# Patient Record
Sex: FEMALE | Race: WHITE
[De-identification: names, ages, dates, MRNs, and addresses within clinical notes are randomized per-mention and may not be internally consistent; named-entity substitution may affect disease eponyms.]

---

## 2019-01-09 ENCOUNTER — HOSPITAL ENCOUNTER (OUTPATIENT)
Dept: HOSPITAL 96 - M.RAD | Age: 79
End: 2019-01-09
Attending: FAMILY MEDICINE
Payer: COMMERCIAL

## 2019-01-09 DIAGNOSIS — Z12.31: Primary | ICD-10-CM

## 2020-06-17 ENCOUNTER — HOSPITAL ENCOUNTER (OUTPATIENT)
Dept: HOSPITAL 96 - M.RAD | Age: 80
End: 2020-06-17
Attending: FAMILY MEDICINE
Payer: COMMERCIAL

## 2020-06-17 DIAGNOSIS — Z12.31: Primary | ICD-10-CM

## 2020-08-30 ENCOUNTER — HOSPITAL ENCOUNTER (OUTPATIENT)
Dept: HOSPITAL 96 - M.ERS | Age: 80
Setting detail: OBSERVATION
LOS: 2 days | Discharge: HOME | End: 2020-09-01
Attending: INTERNAL MEDICINE | Admitting: INTERNAL MEDICINE
Payer: COMMERCIAL

## 2020-08-30 VITALS — DIASTOLIC BLOOD PRESSURE: 45 MMHG | SYSTOLIC BLOOD PRESSURE: 138 MMHG

## 2020-08-30 VITALS — WEIGHT: 147.4 LBS | BODY MASS INDEX: 30.94 KG/M2 | HEIGHT: 57.99 IN

## 2020-08-30 VITALS — SYSTOLIC BLOOD PRESSURE: 113 MMHG | DIASTOLIC BLOOD PRESSURE: 56 MMHG

## 2020-08-30 VITALS — SYSTOLIC BLOOD PRESSURE: 104 MMHG | DIASTOLIC BLOOD PRESSURE: 54 MMHG

## 2020-08-30 DIAGNOSIS — R55: Primary | ICD-10-CM

## 2020-08-30 DIAGNOSIS — I95.1: ICD-10-CM

## 2020-08-30 DIAGNOSIS — E78.5: ICD-10-CM

## 2020-08-30 DIAGNOSIS — Z79.82: ICD-10-CM

## 2020-08-30 DIAGNOSIS — I10: ICD-10-CM

## 2020-08-30 DIAGNOSIS — Z20.828: ICD-10-CM

## 2020-08-30 DIAGNOSIS — Z79.899: ICD-10-CM

## 2020-08-30 DIAGNOSIS — N28.9: ICD-10-CM

## 2020-08-30 LAB
ABSOLUTE BASOPHILS: 0.1 THOU/UL (ref 0–0.2)
ABSOLUTE EOSINOPHILS: 0 THOU/UL (ref 0–0.7)
ABSOLUTE MONOCYTES: 0.9 THOU/UL (ref 0–1.2)
ALBUMIN SERPL-MCNC: 3.5 G/DL (ref 3.4–5)
ALP SERPL-CCNC: 61 U/L (ref 46–116)
ALT SERPL-CCNC: 20 U/L (ref 30–65)
ANION GAP SERPL CALC-SCNC: 8 MMOL/L (ref 7–16)
AST SERPL-CCNC: 21 U/L (ref 15–37)
BACTERIA #/AREA URNS HPF: (no result) /HPF
BASOPHILS NFR BLD AUTO: 0.9 %
BILIRUB SERPL-MCNC: 0.3 MG/DL
BUN SERPL-MCNC: 32 MG/DL (ref 7–18)
CALCIUM SERPL-MCNC: 8.7 MG/DL (ref 8.5–10.1)
CHLORIDE SERPL-SCNC: 100 MMOL/L (ref 98–107)
CO2 SERPL-SCNC: 26 MMOL/L (ref 21–32)
CREAT SERPL-MCNC: 1.4 MG/DL (ref 0.6–1.3)
EOSINOPHIL NFR BLD: 0.2 %
GLUCOSE SERPL-MCNC: 133 MG/DL (ref 70–99)
GRANULOCYTES NFR BLD MANUAL: 79.9 %
HCT VFR BLD CALC: 39.9 % (ref 37–47)
HGB BLD-MCNC: 13.6 GM/DL (ref 12–15)
HYALINE CASTS #/AREA URNS LPF: (no result) /LPF
LYMPHOCYTES # BLD: 1.8 THOU/UL (ref 0.8–5.3)
LYMPHOCYTES NFR BLD AUTO: 12.9 %
MCH RBC QN AUTO: 32 PG (ref 26–34)
MCHC RBC AUTO-ENTMCNC: 34.1 G/DL (ref 28–37)
MCV RBC: 93.8 FL (ref 80–100)
MONOCYTES NFR BLD: 6.1 %
MPV: 8.2 FL. (ref 7.2–11.1)
NEUTROPHILS # BLD: 11.2 THOU/UL (ref 1.6–8.1)
NUCLEATED RBCS: 0 /100WBC
PLATELET COUNT*: 305 THOU/UL (ref 150–400)
POTASSIUM SERPL-SCNC: 4.9 MMOL/L (ref 3.5–5.1)
PROT SERPL-MCNC: 7.4 G/DL (ref 6.4–8.2)
RBC # BLD AUTO: 4.25 MIL/UL (ref 4.2–5)
RBC #/AREA URNS HPF: (no result) /HPF (ref 0–2)
RDW-CV: 13.3 % (ref 10.5–14.5)
SODIUM SERPL-SCNC: 134 MMOL/L (ref 136–145)
SQUAMOUS: (no result) /LPF (ref 0–3)
WBC # BLD AUTO: 14.1 THOU/UL (ref 4–11)
WBC #/AREA URNS HPF: (no result) /HPF (ref 0–5)

## 2020-08-31 VITALS — SYSTOLIC BLOOD PRESSURE: 153 MMHG | DIASTOLIC BLOOD PRESSURE: 59 MMHG

## 2020-08-31 VITALS — SYSTOLIC BLOOD PRESSURE: 128 MMHG | DIASTOLIC BLOOD PRESSURE: 37 MMHG

## 2020-08-31 VITALS — SYSTOLIC BLOOD PRESSURE: 113 MMHG | DIASTOLIC BLOOD PRESSURE: 36 MMHG

## 2020-08-31 VITALS — DIASTOLIC BLOOD PRESSURE: 35 MMHG | SYSTOLIC BLOOD PRESSURE: 133 MMHG

## 2020-08-31 VITALS — SYSTOLIC BLOOD PRESSURE: 141 MMHG | DIASTOLIC BLOOD PRESSURE: 41 MMHG

## 2020-08-31 LAB
ANION GAP SERPL CALC-SCNC: 5 MMOL/L (ref 7–16)
ANION GAP SERPL CALC-SCNC: 5 MMOL/L (ref 7–16)
BUN SERPL-MCNC: 25 MG/DL (ref 7–18)
BUN SERPL-MCNC: 27 MG/DL (ref 7–18)
CALCIUM SERPL-MCNC: 8.3 MG/DL (ref 8.5–10.1)
CALCIUM SERPL-MCNC: 8.4 MG/DL (ref 8.5–10.1)
CHLORIDE SERPL-SCNC: 99 MMOL/L (ref 98–107)
CHLORIDE SERPL-SCNC: 99 MMOL/L (ref 98–107)
CO2 SERPL-SCNC: 26 MMOL/L (ref 21–32)
CO2 SERPL-SCNC: 27 MMOL/L (ref 21–32)
CREAT SERPL-MCNC: 1.3 MG/DL (ref 0.6–1.3)
CREAT SERPL-MCNC: 1.4 MG/DL (ref 0.6–1.3)
GLUCOSE SERPL-MCNC: 109 MG/DL (ref 70–99)
GLUCOSE SERPL-MCNC: 140 MG/DL (ref 70–99)
POTASSIUM SERPL-SCNC: 4.4 MMOL/L (ref 3.5–5.1)
POTASSIUM SERPL-SCNC: 4.7 MMOL/L (ref 3.5–5.1)
SODIUM SERPL-SCNC: 130 MMOL/L (ref 136–145)
SODIUM SERPL-SCNC: 131 MMOL/L (ref 136–145)

## 2020-08-31 NOTE — 2DMMODE
[place] [address]
Phone:  [phone] 2 D/M-MODE ECHOCARDIOGRAM     
_______________________________________________________________________________
 
Name:         YESSICA KNIGHT               Room:          [address]
SHABNAM#:    [id number]     Account #:     [id number]  
Admission:    20    Attend Phys:   Khushboo Langford, 
Discharge:                Date of Birth: [birth year]  
Date of Service: 20 1614  Report #:      [id number]
        [id number]
_______________________________________________________________________________
THIS REPORT FOR:
 
cc:  Meme English MD, Tuongvan T. MD Holkins, John M. MD [facility]       
                                                                       ~
 
--------------- APPROVED REPORT --------------
 
 
Study performed:  2020 13:48:52
 
EXAM: Comprehensive 2D, Doppler, and color-flow 
Echocardiogram 
Patient Location: In-Patient   
Room #:  224     Status:  routine
 
      BSA:         1.58
HR: 73 bpm BP:          133/35 mmHg 
Rhythm: NSR     
 
Other Information 
Study Quality: Good
 
Indications
Chest Pain
 
2D Dimensions
IVSd:  9.81 (7-11mm) LVOT Diam:  19.65 (18-24mm) 
LVDd:  43.95 mm  
PWd:  8.88 (7-11mm) Ascending Ao:  28.00 (22-36mm)
LVDs:  26.49 (25-40mm) 
Aortic Root:  25.67 mm 
 
Volumes
Left Atrial Volume (Systole) 
    LA ESV Index:  25.20 mL/m2
 
Aortic Valve
AoV Peak Kel.:  1.67 m/s 
AO Peak Gr.:  11.13 mmHg LVOT Max P.78 mmHg
AO Mean Gr.:  5.30 mmHg  LVOT Mean PG:  3.16 mmHg
    LVOT Max V:  1.30 m/s
AO V2 VTI:  37.09 cm  LVOT Mean V:  0.81 m/s
JOHANNA (VTI):  2.58 cm2  LVOT V1 VTI:  31.57 cm
 
 
 
[place] [address]
Phone:  [phone]                     2 D/M-MODE ECHOCARDIOGRAM     
_______________________________________________________________________________
 
Name:         YESSICA KNIGHT               Room:          [address]#:    [id number]     Account #:     [id number]  
Admission:    20    Attend Phys:   Khushboo Langford, 
Discharge:                Date of Birth: [birth year]  
Date of Service: 20 1614  Report #:      [id number]
        [id number]
_______________________________________________________________________________
Mitral Valve
MV Mean Gr.:  2.82 mmHg  E/A Ratio:  0.74
    MV Decel. Time:  341.16 ms
MV E Max Kel.:  0.93 m/s 
MV PHT:  98.94 ms  
MVA (PHT):  2.22 cm2  
 
TDI
E/Lateral E':  10.33 E/Medial E':  11.63
   Medial E' Kel.:  0.08 m/s
   Lateral E' Kel.:  0.09 m/s
 
Pulmonary Valve
PV Peak Kel.:  0.96 m/s PV Peak Gr.:  3.68 mmHg
 
Tricuspid Valve
    RAP Estimate:  5.00 mmHg
TR Peak Gr.:  26.52 mmHg RVSP:  31.00 mmHg
    PA Pressure:  31.00 mmHg
 
Left Ventricle
The left ventricle is normal size. There is normal LV segmental wall 
motion. There is normal left ventricular wall thickness. Left 
ventricular systolic function is normal. The left ventricular 
ejection fraction is within the normal range. LVEF is 60%. Grade I - 
abnormal relaxation pattern.
 
Right Ventricle
The right ventricle is normal size. The right ventricular systolic 
function is normal.
 
Atria
The left atrium size is normal. The right atrium size is 
normal.
 
Aortic Valve
Mild aortic valve sclerosis. No aortic regurgitation is present. 
There is no aortic valvular stenosis.
 
Mitral Valve
The mitral valve is normal in structure. Mild mitral regurgitation. 
No evidence of mitral valve stenosis.
 
Tricuspid Valve
The tricuspid valve is normal in structure. Mild tricuspid 
regurgitation.
 
 
[place] [address]
Phone:  [phone]                     2 D/M-MODE ECHOCARDIOGRAM     
_______________________________________________________________________________
 
Name:         YESSICA KNIGHT               Room:          [address]#:    [id number]     Account #:     [id number]  
Admission:    20    Attend Phys:   Khushboo Langford, 
Discharge:                Date of Birth: [birth year]  
Date of Service: 20 1614  Report #:      [id number]
        [id number]
_______________________________________________________________________________
 
Pulmonic Valve
The pulmonary valve is normal in structure. Trace pulmonic 
regurgitation.
 
Great Vessels
The aortic root is normal in size. IVC is normal in size and 
collapses >50% with inspiration.
 
Pericardium
There is no pericardial effusion.
 
<Conclusion>
The left ventricle is normal size.
There is normal left ventricular wall thickness.
Left ventricular systolic function is normal.
The left ventricular ejection fraction is within the normal 
range.
LVEF is 60%.
Grade I - abnormal relaxation pattern.
The right ventricle is normal size.
The left atrium size is normal.
The right atrium size is normal.
Mild aortic valve sclerosis.
No aortic regurgitation is present.
There is no aortic valvular stenosis.
The mitral valve is normal in structure.
Mild mitral regurgitation.
No evidence of mitral valve stenosis.
The tricuspid valve is normal in structure.
Mild tricuspid regurgitation.
IVC is normal in size and collapses >50% with inspiration.
There is no pericardial effusion.
There is normal LV segmental wall motion.
 
 
 
 
 
 
 
 
 
 
  <ELECTRONICALLY SIGNED>
                                           By: Doug Pham MD, FACC     
  20     1614
D: 20 1614   _____________________________________
T: 20 1614   Doug Pham MD, FACC       /INF

## 2020-08-31 NOTE — EKG
[place] [address]
Phone:  [phone]                     ELECTROCARDIOGRAM REPORT      
_______________________________________________________________________________
 
Name:         YESSICA KNIGHT DOROTHY               Room:          [address]#:    [id number]     Account #:     [id number]  
Admission:    20    Attend Phys:   Khushboo Langford, 
Discharge:                Date of Birth: [birth year]  
Date of Service: 20  Report #:      [id number]
        [id number]
_______________________________________________________________________________
THIS REPORT FOR:  //name//                      
 
                         [facility] ED
                                       
Test Date:    2020               Test Time:    20:16:49
Pat Name:     YESSICA KNIGHT            Department:   
Patient ID:   [id number]            Room:         [facility]
Gender:       F                        Technician:   JAIDA
:          1940               Requested By: Bebe Judd
Order Number: [id number] MD:   Doug Pham
                                 Measurements
Intervals                              Axis          
Rate:         61                       P:            50
ID:           [id number]                      QRS:          -15
QRSD:         84                       T:            6
QT:           466                                    
QTc:          470                                    
                           Interpretive Statements
Sinus rhythm
Borderline left axis deviation
Low voltage, precordial leads
Consider inferior and anterior infarcts
Compared to ECG 2014 10:02:13
Low QRS voltage now present
Myocardial infarct finding now present
Sinus bradycardia no longer present
Electronically Signed On 2020 9:58:30 CDT by Doug Pham
[email or web address]
 
 
 
 
 
 
 
 
 
 
 
 
 
 
 
 
  <ELECTRONICALLY SIGNED>
                                           By: Doug Pham MD, FAC     
  20     0958
D: 20   _____________________________________
T: 20   Doug Pham MD, [facility]       /EPI

## 2020-09-01 VITALS — SYSTOLIC BLOOD PRESSURE: 147 MMHG | DIASTOLIC BLOOD PRESSURE: 41 MMHG

## 2020-09-01 VITALS — SYSTOLIC BLOOD PRESSURE: 130 MMHG | DIASTOLIC BLOOD PRESSURE: 49 MMHG

## 2020-09-01 VITALS — DIASTOLIC BLOOD PRESSURE: 45 MMHG | SYSTOLIC BLOOD PRESSURE: 105 MMHG

## 2020-09-01 VITALS — SYSTOLIC BLOOD PRESSURE: 137 MMHG | DIASTOLIC BLOOD PRESSURE: 81 MMHG

## 2020-09-01 VITALS — DIASTOLIC BLOOD PRESSURE: 41 MMHG | SYSTOLIC BLOOD PRESSURE: 147 MMHG

## 2020-09-01 LAB
ANION GAP SERPL CALC-SCNC: 5 MMOL/L (ref 7–16)
BUN SERPL-MCNC: 19 MG/DL (ref 7–18)
CALCIUM SERPL-MCNC: 8.5 MG/DL (ref 8.5–10.1)
CHLORIDE SERPL-SCNC: 103 MMOL/L (ref 98–107)
CHOLEST SERPL-MCNC: 131 MG/DL (ref ?–200)
CO2 SERPL-SCNC: 25 MMOL/L (ref 21–32)
CREAT SERPL-MCNC: 1.1 MG/DL (ref 0.6–1.3)
GLUCOSE SERPL-MCNC: 86 MG/DL (ref 70–99)
HDLC SERPL-MCNC: 82 MG/DL (ref 40–?)
LDLC SERPL-MCNC: 35 MG/DL (ref ?–100)
POTASSIUM SERPL-SCNC: 5.4 MMOL/L (ref 3.5–5.1)
SODIUM SERPL-SCNC: 133 MMOL/L (ref 136–145)
TC:HDL: 1.6 RATIO
TRIGL SERPL-MCNC: 70 MG/DL (ref ?–150)
TROPONIN-I LEVEL: <0.06 NG/ML (ref ?–0.06)
VLDLC SERPL CALC-MCNC: 14 MG/DL (ref ?–40)

## 2020-09-02 NOTE — CON
[address]  [address]                    CONSULTATION                  
_______________________________________________________________________________
 
Name:       YESSICA KNIGHT                Room:           [address] Diandra HAQUE#:  [id number]      Account #:      [id number]  
Admission:  08/30/20     Attend Phys:    Khushboo Langford MD 
Discharge:  09/01/20     Date of Birth:  [birth year]  
         Report #: [id number]
                                                                     [id number]  
_______________________________________________________________________________
THIS REPORT FOR:  //name//                      
 
cc:  Meme English MD, Tuongvan T. MD                                                  ~
THIS REPORT FOR:  //name//                      
 
CC: Khushboo English
 
DATE OF SERVICE:  09/01/2020
 
 
HISTORY OF PRESENT ILLNESS:  This is an 80-year-old female patient who was
evaluated by me for an episode of syncope.  The patient's records were reviewed
and she does not know why she passed out and how long she was out, but
apparently it was only for a couple of minutes.  There is no firsthand witness
so she does not know whether she had any tonic-clonic activity.  She did have
some nausea, vomiting associated with that.  She was working before it happened
and after the fluid, she started feeling better.
 
The patient had a prior episode.  She admitted that there were about 2-4
episodes.  This was the most severe one.  She does not know if they were related
to activity, dehydration or something more was present.  She does have some
history of hypertension.  She is not a diabetic.  She does not take any
hypoglycemic medication.  She did have trouble with the knees in the past.  She
has an elevated cholesterol.  She denies any prior history of stroke.
 
REVIEW OF SYSTEMS:  A 14-point review of system was otherwise noncontributory. 
She is not complaining of any new eye, ENT, cardiac, respiratory, GI, ,
musculoskeletal, constitutional, dermatological, hematological, psychiatric,
throat, allergic symptom associated with present symptomatology.
 
PAST MEDICAL HISTORY:  As described above.
 
FAMILY HISTORY:  Noncontributory.
 
SOCIAL HISTORY:  She does not drink any alcohol or smoke.
 
PHYSICAL EXAMINATION:  Indicates that she is alert, responsive, able to follow
simple and complex command.  Her higher function looks unremarkable.  Speech
looks unremarkable.  Cranial nerve examination 2-12 does not appear to be
showing any definite abnormality.  She has symmetrical strength, sensation,
reflexes and tone in all 4 extremities.  There is no meningeal sign.  There is
no carotid bruit.  There is no thyroid mass.  I could not look at the patient's
fundus.  She is moderately built individual.  She does not have any vision and
hearing problem.  Blood pressure is 137/81, respiration is 16, pulse is 61,
 
 
 
[place] [address]                    CONSULTATION                  
_______________________________________________________________________________
 
Name:       YESSICA KNIGHT                Room:           [address]#:  [id number]      Account #:      [id number]  
Admission:  08/30/20     Attend Phys:    Khushboo Langford MD 
Discharge:  09/01/20     Date of Birth:  [birth year]  
         Report #: [id number]
                                                                     [id number]  
_______________________________________________________________________________
temperature is 97.9.  Cardiac examination is noncontributory.  No respiratory
difficulty was noticed.  No rhonchi was noticed.  Pulses appeared to be
palpable.
 
LABORATORY DATA:  She did have a CT scan and a carotid Doppler done when she was
in the hospital and that does not appear to be showing any definite abnormality.
 
IMPRESSION AND PLAN:  Most likely this episode is cardiac which may be because
of dehydration or some other cardiac problem,  but this is her fourth episode. 
They were similar episode and the possibility of seizure or posterior fossa
transient ischemic attack need to be excluded.  I will get an MRI, MRA and EEG
done and if that is negative, I do not think any further neurological workup is
necessary and an attempt can be made to continue to look for any other
pathology.
 
Thank you very much for this referral and if you have any question, please feel
free to contact me.
 
 
 
 
 
 
 
 
 
 
 
 
 
 
 
 
 
 
 
 
 
 
 
 
 
 
 
<ELECTRONICALLY SIGNED>
                                        By:  Zane Gooden MD         
09/02/20     1208
D: 09/01/20 1316_______________________________________
T: 09/01/20 1414Pleo Gooden MD            /nt